# Patient Record
Sex: FEMALE | Race: WHITE
[De-identification: names, ages, dates, MRNs, and addresses within clinical notes are randomized per-mention and may not be internally consistent; named-entity substitution may affect disease eponyms.]

---

## 2017-08-11 ENCOUNTER — HOSPITAL ENCOUNTER (OUTPATIENT)
Dept: HOSPITAL 53 - M SMT | Age: 32
End: 2017-08-11
Attending: OBSTETRICS & GYNECOLOGY
Payer: COMMERCIAL

## 2017-08-11 DIAGNOSIS — O16.2: Primary | ICD-10-CM

## 2017-08-11 DIAGNOSIS — Z3A.00: ICD-10-CM

## 2017-08-11 LAB
ALT SERPL W P-5'-P-CCNC: 30 U/L (ref 12–78)
AST SERPL-CCNC: 23 U/L (ref 15–37)
BILIRUB SERPL-MCNC: 0.4 MG/DL (ref 0.2–1)
CREAT SERPL-MCNC: 0.48 MG/DL (ref 0.55–1.02)
ERYTHROCYTE [DISTWIDTH] IN BLOOD BY AUTOMATED COUNT: 15.3 % (ref 11.5–14.5)
GFR SERPL CREATININE-BSD FRML MDRD: > 60 ML/MIN/{1.73_M2} (ref 60–?)
MCH RBC QN AUTO: 27.8 PG (ref 27–33)
MCHC RBC AUTO-ENTMCNC: 32.9 G/DL (ref 32–36.5)
MCV RBC AUTO: 84.6 FL (ref 80–96)
PLATELET # BLD AUTO: 307 K/MM3 (ref 150–450)
URATE SERPL-MCNC: 3.9 MG/DL (ref 2.6–6)
WBC # BLD AUTO: 13.4 K/MM3 (ref 4–10)

## 2017-12-01 ENCOUNTER — HOSPITAL ENCOUNTER (OUTPATIENT)
Dept: HOSPITAL 53 - M LDO | Age: 32
Discharge: HOME | End: 2017-12-01
Attending: OBSTETRICS & GYNECOLOGY
Payer: COMMERCIAL

## 2017-12-01 VITALS — SYSTOLIC BLOOD PRESSURE: 149 MMHG | DIASTOLIC BLOOD PRESSURE: 71 MMHG

## 2017-12-01 VITALS — SYSTOLIC BLOOD PRESSURE: 139 MMHG | DIASTOLIC BLOOD PRESSURE: 66 MMHG

## 2017-12-01 VITALS — SYSTOLIC BLOOD PRESSURE: 167 MMHG | DIASTOLIC BLOOD PRESSURE: 77 MMHG

## 2017-12-01 VITALS — DIASTOLIC BLOOD PRESSURE: 72 MMHG | SYSTOLIC BLOOD PRESSURE: 158 MMHG

## 2017-12-01 VITALS — DIASTOLIC BLOOD PRESSURE: 67 MMHG | SYSTOLIC BLOOD PRESSURE: 144 MMHG

## 2017-12-01 VITALS — WEIGHT: 264.55 LBS | HEIGHT: 67 IN | BODY MASS INDEX: 41.52 KG/M2

## 2017-12-01 DIAGNOSIS — F41.9: ICD-10-CM

## 2017-12-01 DIAGNOSIS — O28.3: Primary | ICD-10-CM

## 2017-12-01 DIAGNOSIS — O10.113: ICD-10-CM

## 2017-12-01 DIAGNOSIS — O99.343: ICD-10-CM

## 2017-12-01 DIAGNOSIS — Z79.899: ICD-10-CM

## 2017-12-01 DIAGNOSIS — Z3A.29: ICD-10-CM

## 2017-12-01 LAB
ALBUMIN SERPL BCG-MCNC: 2.7 GM/DL (ref 3.2–5.2)
ALBUMIN/GLOB SERPL: 0.61 {RATIO} (ref 1–1.93)
ALP SERPL-CCNC: 91 U/L (ref 45–117)
ALT SERPL W P-5'-P-CCNC: 14 U/L (ref 12–78)
ANION GAP SERPL CALC-SCNC: 11 MEQ/L (ref 8–16)
AST SERPL-CCNC: 5 U/L (ref 7–37)
BILIRUB SERPL-MCNC: 0.3 MG/DL (ref 0.2–1)
BUN SERPL-MCNC: 8 MG/DL (ref 7–18)
CALCIUM SERPL-MCNC: 8.7 MG/DL (ref 8.5–10.1)
CHLORIDE SERPL-SCNC: 108 MEQ/L (ref 98–107)
CO2 SERPL-SCNC: 20 MEQ/L (ref 21–32)
CREAT SERPL-MCNC: 0.44 MG/DL (ref 0.55–1.02)
ERYTHROCYTE [DISTWIDTH] IN BLOOD BY AUTOMATED COUNT: 14.2 % (ref 11.5–14.5)
GFR SERPL CREATININE-BSD FRML MDRD: > 60 ML/MIN/{1.73_M2} (ref 60–?)
GLUCOSE SERPL-MCNC: 83 MG/DL (ref 70–105)
MCH RBC QN AUTO: 27.8 PG (ref 27–33)
MCHC RBC AUTO-ENTMCNC: 32.8 G/DL (ref 32–36.5)
MCV RBC AUTO: 84.5 FL (ref 80–96)
NRBC BLD AUTO-RTO: 0 % (ref 0–0)
PLATELET # BLD AUTO: 301 10^3/UL (ref 150–450)
POTASSIUM SERPL-SCNC: 3.6 MEQ/L (ref 3.5–5.1)
PROT SERPL-MCNC: 7.1 GM/DL (ref 6.4–8.2)
SODIUM SERPL-SCNC: 139 MEQ/L (ref 136–145)
URATE SERPL-MCNC: 4 MG/DL (ref 2.6–6)
WBC # BLD AUTO: 13.4 10^3/UL (ref 4–10)

## 2017-12-01 NOTE — REP
Obstetric sonography:

 

History:  Supervision of pregnancy.  Limited study.  Hypertension.

 

Findings:  Scanning demonstrates a viable single intrauterine gestation in a

cephalic fetal lie.  Placenta is posterior grade 2 without evidence of previa.

SD ratio in the umbilical cord artery by Doppler is normal at 2.75.  Fetal heart

rate is recorded at 136 beats per minute.  Closed cervical length measured

transabdominally is 4.5 cm.  Biophysical profile score is eight out of a possible

eight.  Amniotic fluid is subjectively normal.  ANNA is slightly decreased however

at 8.3 cm (9.1-23.2).  SD ratio in the umbilical cord artery by Doppler is normal

at 33.8 centimeters per second.

 

 

Signed by

Ronald Newton MD 12/03/2017 12:31 P

## 2017-12-01 NOTE — IPNPDOC
Text Note


Date of Service


The patient was seen on 17.





NOTE





SUBJECTIVE: Patient is a 32-year-old-female who is a  at 29.2 weeks 

gestation with an CASSY of 17 based off of her 1st trimester ultrasound. She 

initiated care in her 1st trimester at [facility]. Her pregnancy has 

been complicated by CHTN, anxiety, and a recent Sofía en Y gastric bypass in May 

2017. She is currently taking Labetalol 400 mg PO BID for CHTN, Vitafusion 

since her bypass, and buspar for her anxiety. She presented to L&D after having 

a BPP in another hospital of . Denies pain. She reports active fetal 

movement. Denies leaking of fluid, vaginal bleeding, contractions, or 

preeclamptic symptoms including visual changes, headaches, or epigastric pain. 





OBJECTIVE: VS: See below. Labs: see below. , moderate variability, 

positive 10x10 and 15x15 accelerations, no decelerations. Contractions: none. 

Abdomen soft to palpation. 





ASSESSMENT: IUP AT 29.2 weeks gestation, CHTN, post Sofía en Y gastric bypass, 

anxiety, Category I FHR tracing





PLAN: Dr. Vargas has evaluated the patient when she arrived to the hospital. Her 

ultrasound and NST result in a 10/10 BPP. Patient to continue with her routine 

OB visits and weekly BPP's. Patient to be discharged to home with her . 

Education done on access to care, fetal kick counts, danger signs, and 

preeclamptic symptoms to report. Patient is to continue with her Labetalol 400 

mg BID as ordered and fingersticks as directed.





VS,Eugene, I+O


VS, Caitlyne, I+O


Laboratory Tests


17 15:49








Calcium Level 8.7, Aspartate Amino Transf (AST/SGOT) 5 L, Alanine 

Aminotransferase (ALT/SGPT) 14, Lactate Dehydrogenase 134, Alkaline Phosphatase 

91, Total Bilirubin 0.3, Uric Acid 4.0, Total Protein 7.1, Albumin 2.7 L





17 15:55








Red Blood Count 4.00, Mean Corpuscular Volume 84.5, Mean Corpuscular Hemoglobin 

27.8, Mean Corpuscular Hemoglobin Concent 32.8, Red Cell Distribution Width 14.2











Item Value  Date Time


 


Urine Random Creatinine 200.0 MG/DL 17 1549


 


Urine Random Total Protein 20.1 MG/DL H 17 1549











Vital Signs








  Date Time  Temp Pulse Resp B/P (MAP) Pulse Ox O2 Delivery O2 Flow Rate FiO2


 


17 20:02    144/67    


 


17 19:11 98.3 69 18     














Obstetric sonography:


 


History:  Supervision of pregnancy.  Limited study.  Hypertension.


 


Findings:  Scanning demonstrates a viable single intrauterine gestation in a


cephalic fetal lie.  Placenta is posterior grade 2 without evidence of previa.


SD ratio in the umbilical cord artery by Doppler is normal at 2.75.  Fetal heart


rate is recorded at 136 beats per minute.  Closed cervical length measured


transabdominally is 4.5 cm.  Biophysical profile score is eight out of a 

possible


eight.  Amniotic fluid is subjectively normal.  ANNA is slightly decreased 

however


at 8.3 cm (9.1-23.2).  SD ratio in the umbilical cord artery by Doppler is 

normal


at 33.8 centimeters per second.











SUSAN MCKEON CNM Dec 1, 2017 20:32

## 2017-12-12 ENCOUNTER — HOSPITAL ENCOUNTER (OUTPATIENT)
Dept: HOSPITAL 53 - M SMT | Age: 32
End: 2017-12-12
Attending: OBSTETRICS & GYNECOLOGY
Payer: COMMERCIAL

## 2017-12-12 DIAGNOSIS — Z36.89: Primary | ICD-10-CM

## 2017-12-12 DIAGNOSIS — Z3A.00: ICD-10-CM

## 2017-12-12 LAB
ALT SERPL W P-5'-P-CCNC: 10 U/L (ref 12–78)
AST SERPL-CCNC: 8 U/L (ref 7–37)
BILIRUB SERPL-MCNC: 0.3 MG/DL (ref 0.2–1)
CREAT SERPL-MCNC: 0.61 MG/DL (ref 0.55–1.02)
ERYTHROCYTE [DISTWIDTH] IN BLOOD BY AUTOMATED COUNT: 14 % (ref 11.5–14.5)
GFR SERPL CREATININE-BSD FRML MDRD: > 60 ML/MIN/{1.73_M2} (ref 60–?)
MCH RBC QN AUTO: 27.8 PG (ref 27–33)
MCHC RBC AUTO-ENTMCNC: 32.5 G/DL (ref 32–36.5)
MCV RBC AUTO: 85.5 FL (ref 80–96)
NRBC BLD AUTO-RTO: 0 % (ref 0–0)
PLATELET # BLD AUTO: 307 10^3/UL (ref 150–450)
URATE SERPL-MCNC: 3.8 MG/DL (ref 2.6–6)
WBC # BLD AUTO: 11.8 10^3/UL (ref 4–10)

## 2018-01-02 ENCOUNTER — HOSPITAL ENCOUNTER (OUTPATIENT)
Dept: HOSPITAL 53 - M LAB REF | Age: 33
End: 2018-01-02
Attending: ADVANCED PRACTICE MIDWIFE
Payer: COMMERCIAL

## 2018-01-02 ENCOUNTER — HOSPITAL ENCOUNTER (OUTPATIENT)
Dept: HOSPITAL 53 - M LDO | Age: 33
Discharge: HOME | End: 2018-01-02
Attending: OBSTETRICS & GYNECOLOGY
Payer: COMMERCIAL

## 2018-01-02 DIAGNOSIS — Z3A.33: ICD-10-CM

## 2018-01-02 DIAGNOSIS — O10.013: Primary | ICD-10-CM

## 2018-01-02 DIAGNOSIS — Z3A.00: ICD-10-CM

## 2018-01-02 DIAGNOSIS — O10.913: Primary | ICD-10-CM

## 2018-01-02 DIAGNOSIS — Z79.899: ICD-10-CM

## 2018-01-02 LAB
ALT SERPL W P-5'-P-CCNC: 10 U/L (ref 12–78)
AST SERPL-CCNC: 15 U/L (ref 7–37)
BILIRUBIN,TOTAL: 0.3 MG/DL (ref 0.2–1)
CREATININE FOR GFR: 0.43 MG/DL (ref 0.55–1.02)
CREATININE,RANDOM URINE: 64 MG/DL
GFR SERPL CREATININE-BSD FRML MDRD: > 60 ML/MIN/{1.73_M2} (ref 60–?)
GLUCOSE BLDC GLUCOMTR-MCNC: 87 MG/DL (ref 70–105)
HEMATOCRIT: 36.2 % (ref 36–47)
HEMOGLOBIN: 12.3 G/DL (ref 12–16)
LDH SERPL L TO P-CCNC: 172 U/L (ref 84–246)
MEAN CORPUSCULAR HEMOGLOBIN: 28.2 PG (ref 27–33)
MEAN CORPUSCULAR HGB CONC: 34 G/DL (ref 32–36.5)
MEAN CORPUSCULAR VOLUME: 83 FL (ref 80–96)
NRBC BLD AUTO-RTO: 0 % (ref 0–0)
PLATELET COUNT, AUTOMATED: 317 10^3/UL (ref 150–450)
RED BLOOD COUNT: 4.36 10^6/UL (ref 4–5.4)
RED CELL DISTRIBUTION WIDTH: 13.3 % (ref 11.5–14.5)
TOTAL PROTEIN,RANDOM URINE: 15.9 MG/DL (ref 0–12)
URIC ACID: 3.8 MG/DL (ref 2.6–6)
WHITE BLOOD COUNT: 13 10^3/UL (ref 4–10)

## 2018-01-02 PROCEDURE — 87081 CULTURE SCREEN ONLY: CPT

## 2018-01-02 PROCEDURE — 96372 THER/PROPH/DIAG INJ SC/IM: CPT

## 2018-01-02 RX ADMIN — LABETALOL HCL 1 MG: 200 TABLET, FILM COATED ORAL at 13:51

## 2018-01-02 RX ADMIN — BETAMETHASONE SODIUM PHOSPHATE AND BETAMETHASONE ACETATE 1 MG: 3; 3 INJECTION, SUSPENSION INTRA-ARTICULAR; INTRALESIONAL; INTRAMUSCULAR at 12:06

## 2018-01-02 RX ADMIN — ACETAMINOPHEN 1 MG: 500 TABLET ORAL at 12:06

## 2018-01-03 ENCOUNTER — HOSPITAL ENCOUNTER (OUTPATIENT)
Dept: HOSPITAL 53 - M LDO | Age: 33
Discharge: HOME | End: 2018-01-03
Attending: OBSTETRICS & GYNECOLOGY
Payer: COMMERCIAL

## 2018-01-03 DIAGNOSIS — Z3A.35: ICD-10-CM

## 2018-01-03 DIAGNOSIS — O14.93: Primary | ICD-10-CM

## 2018-01-03 PROCEDURE — 96372 THER/PROPH/DIAG INJ SC/IM: CPT

## 2018-01-03 RX ADMIN — BETAMETHASONE SODIUM PHOSPHATE AND BETAMETHASONE ACETATE 1 MG: 3; 3 INJECTION, SUSPENSION INTRA-ARTICULAR; INTRALESIONAL; INTRAMUSCULAR at 12:44

## 2018-01-17 ENCOUNTER — HOSPITAL ENCOUNTER (OUTPATIENT)
Dept: HOSPITAL 53 - M LDO | Age: 33
Discharge: HOME | End: 2018-01-17
Attending: OBSTETRICS & GYNECOLOGY
Payer: COMMERCIAL

## 2018-01-17 DIAGNOSIS — Z3A.36: ICD-10-CM

## 2018-01-17 DIAGNOSIS — O10.013: Primary | ICD-10-CM

## 2018-01-17 PROCEDURE — 59025 FETAL NON-STRESS TEST: CPT

## 2018-01-24 ENCOUNTER — HOSPITAL ENCOUNTER (INPATIENT)
Dept: HOSPITAL 53 - M LDI | Age: 33
LOS: 6 days | Discharge: HOME | DRG: 540 | End: 2018-01-30
Attending: ADVANCED PRACTICE MIDWIFE | Admitting: ADVANCED PRACTICE MIDWIFE
Payer: COMMERCIAL

## 2018-01-24 DIAGNOSIS — E66.9: ICD-10-CM

## 2018-01-24 DIAGNOSIS — Z3A.37: ICD-10-CM

## 2018-01-24 DIAGNOSIS — Z79.899: ICD-10-CM

## 2018-01-24 DIAGNOSIS — O61.0: ICD-10-CM

## 2018-01-24 LAB
ALBUMIN/GLOBULIN RATIO: 0.73 (ref 1–1.93)
ALBUMIN: 2.7 GM/DL (ref 3.2–5.2)
ALKALINE PHOSPHATASE: 133 U/L (ref 45–117)
ALT SERPL W P-5'-P-CCNC: 10 U/L (ref 12–78)
ALT SERPL W P-5'-P-CCNC: 12 U/L (ref 12–78)
AMPHETAMINES UR QL SCN: NEGATIVE
ANION GAP: 8 MEQ/L (ref 8–16)
APPEARANCE, URINE: (no result)
AST SERPL-CCNC: 10 U/L (ref 7–37)
AST SERPL-CCNC: 12 U/L (ref 7–37)
BACTERIA UR QL AUTO: (no result)
BARBITURATES UR QL SCN: NEGATIVE
BENZODIAZ UR QL SCN: NEGATIVE
BILIRUBIN, URINE AUTO: NEGATIVE
BILIRUBIN,TOTAL: 0.2 MG/DL (ref 0.2–1)
BILIRUBIN,TOTAL: 0.2 MG/DL (ref 0.2–1)
BLOOD UREA NITROGEN: 8 MG/DL (ref 7–18)
BLOOD, URINE BLOOD: NEGATIVE
BZE UR QL SCN: NEGATIVE
CALCIUM LEVEL: 8.2 MG/DL (ref 8.5–10.1)
CANNABINOIDS UR QL SCN: NEGATIVE
CARBON DIOXIDE LEVEL: 22 MEQ/L (ref 21–32)
CHLORIDE LEVEL: 110 MEQ/L (ref 98–107)
CREATININE FOR GFR: 0.52 MG/DL (ref 0.55–1.02)
CREATININE FOR GFR: 0.53 MG/DL (ref 0.55–1.02)
CREATININE,RANDOM URINE: 274 MG/DL
GFR SERPL CREATININE-BSD FRML MDRD: > 60 ML/MIN/{1.73_M2} (ref 60–?)
GFR SERPL CREATININE-BSD FRML MDRD: > 60 ML/MIN/{1.73_M2} (ref 60–?)
GLUCOSE, FASTING: 85 MG/DL (ref 70–100)
GLUCOSE, URINE (UA) AUTO: NEGATIVE MG/DL
HEMATOCRIT: 34 % (ref 36–47)
HEMOGLOBIN: 11.4 G/DL (ref 12–16)
KETONE, URINE AUTO: NEGATIVE MG/DL
LDH SERPL L TO P-CCNC: 166 U/L (ref 84–246)
LEUKOCYTE ESTERASE UR QL STRIP.AUTO: (no result)
MEAN CORPUSCULAR HEMOGLOBIN: 27.9 PG (ref 27–33)
MEAN CORPUSCULAR HGB CONC: 33.5 G/DL (ref 32–36.5)
MEAN CORPUSCULAR VOLUME: 83.1 FL (ref 80–96)
METHADONE URINE REFLEX: NEGATIVE
MUCUS, URINE: (no result)
NITRITE, URINE AUTO: NEGATIVE
NRBC BLD AUTO-RTO: 0 % (ref 0–0)
OPIATES UR QL SCN: NEGATIVE
PCP UR QL SCN: NEGATIVE
PH,URINE: 5 UNITS (ref 5–9)
PLATELET COUNT, AUTOMATED: 231 10^3/UL (ref 150–450)
POTASSIUM SERUM: 4 MEQ/L (ref 3.5–5.1)
PROT UR QL STRIP.AUTO: (no result) MG/DL
RBC, URINE AUTO: 2 /HPF (ref 0–3)
RED BLOOD COUNT: 4.09 10^6/UL (ref 4–5.4)
RED CELL DISTRIBUTION WIDTH: 14 % (ref 11.5–14.5)
SODIUM LEVEL: 140 MEQ/L (ref 136–145)
SPECIFIC GRAVITY URINE AUTO: 1.03 (ref 1–1.03)
SQUAMOUS #/AREA URNS AUTO: 17 /HPF (ref 0–6)
SYPHILIS: NONREACTIVE
TOTAL PROTEIN,RANDOM URINE: 46.4 MG/DL (ref 0–12)
TOTAL PROTEIN: 6.4 GM/DL (ref 6.4–8.2)
URIC ACID: 4.8 MG/DL (ref 2.6–6)
UROBILINOGEN, URINE AUTO: 4 MG/DL (ref 0–2)
WBC, URINE AUTO: 18 /HPF (ref 0–3)
WHITE BLOOD COUNT: 10.6 10^3/UL (ref 4–10)

## 2018-01-24 RX ADMIN — MISOPROSTOL 1 MCG: 100 TABLET ORAL at 14:20

## 2018-01-24 RX ADMIN — LABETALOL HCL 1 MG: 200 TABLET, FILM COATED ORAL at 09:00

## 2018-01-24 RX ADMIN — MISOPROSTOL 1 MCG: 100 TABLET ORAL at 22:20

## 2018-01-24 RX ADMIN — LABETALOL HYDROCHLORIDE 1 MG: 5 INJECTION INTRAVENOUS at 11:27

## 2018-01-24 RX ADMIN — MISOPROSTOL 1 MCG: 100 TABLET ORAL at 18:25

## 2018-01-24 RX ADMIN — SODIUM CHLORIDE, POTASSIUM CHLORIDE, SODIUM LACTATE AND CALCIUM CHLORIDE 1 ML: 600; 310; 30; 20 INJECTION, SOLUTION INTRAVENOUS at 09:15

## 2018-01-24 RX ADMIN — LABETALOL HCL 1 MG: 200 TABLET, FILM COATED ORAL at 14:20

## 2018-01-24 RX ADMIN — LABETALOL HCL 1 MG: 200 TABLET, FILM COATED ORAL at 20:54

## 2018-01-24 RX ADMIN — MISOPROSTOL 1 MCG: 100 TABLET ORAL at 09:46

## 2018-01-25 LAB
CORD GAS ABE A: -12
CORD GAS ABE V: -3.8
CORD GAS HCO3 A: 15 MEQ/L
CORD GAS HCO3 V: 20.8 MEQ/L
CORD GAS O2 SAT A: 67.5 %
CORD GAS O2 SAT V: 84.8 %
CORD GAS PCO2 A: 38 MMHG
CORD GAS PCO2 V: 36.8 MMHG
CORD GAS PH A: 7.21 UNITS
CORD GAS PH V: 7.37 UNITS
CORD GAS PO2 A: 36.3 MMHG
CORD GAS PO2 V: 39.6 MMHG
CORD GAS SBC A: 14.7 MEQ/L
CORD GAS SBC V: 21 MEQ/L
CORD GAS TCO2 A: 16.2 MEQ/L
CORD GAS TCO2 V: 21.9 MEQ/L

## 2018-01-25 PROCEDURE — 3E0DXGC INTRODUCTION OF OTHER THERAPEUTIC SUBSTANCE INTO MOUTH AND PHARYNX, EXTERNAL APPROACH: ICD-10-PCS

## 2018-01-25 RX ADMIN — MISOPROSTOL 1 MCG: 100 TABLET ORAL at 02:00

## 2018-01-25 RX ADMIN — LABETALOL HYDROCHLORIDE 1 MG: 5 INJECTION INTRAVENOUS at 04:30

## 2018-01-25 RX ADMIN — TERBUTALINE SULFATE 1 MG: 1 INJECTION SUBCUTANEOUS at 18:49

## 2018-01-25 RX ADMIN — SODIUM CHLORIDE, POTASSIUM CHLORIDE, SODIUM LACTATE AND CALCIUM CHLORIDE 1 MLS/HR: 600; 310; 30; 20 INJECTION, SOLUTION INTRAVENOUS at 21:33

## 2018-01-25 RX ADMIN — HYDRALAZINE HYDROCHLORIDE 1 MG: 20 INJECTION INTRAMUSCULAR; INTRAVENOUS at 09:10

## 2018-01-25 RX ADMIN — SODIUM CHLORIDE, POTASSIUM CHLORIDE, SODIUM LACTATE AND CALCIUM CHLORIDE 2 ML: 600; 310; 30; 20 INJECTION, SOLUTION INTRAVENOUS at 11:45

## 2018-01-25 RX ADMIN — LABETALOL HYDROCHLORIDE 1 MG: 5 INJECTION INTRAVENOUS at 08:00

## 2018-01-25 RX ADMIN — LABETALOL HYDROCHLORIDE 1 MG: 5 INJECTION INTRAVENOUS at 04:07

## 2018-01-25 RX ADMIN — LABETALOL HCL 1 MG: 200 TABLET, FILM COATED ORAL at 07:31

## 2018-01-25 RX ADMIN — Medication 1 MLS/HR: at 03:09

## 2018-01-25 RX ADMIN — MISOPROSTOL 1 MCG: 100 TABLET ORAL at 06:00

## 2018-01-25 RX ADMIN — LABETALOL HCL 1 MG: 200 TABLET, FILM COATED ORAL at 16:53

## 2018-01-25 RX ADMIN — PROMETHAZINE HYDROCHLORIDE 1 MG: 25 INJECTION INTRAMUSCULAR; INTRAVENOUS at 05:41

## 2018-01-25 RX ADMIN — SODIUM CHLORIDE, POTASSIUM CHLORIDE, SODIUM LACTATE AND CALCIUM CHLORIDE 2 ML: 600; 310; 30; 20 INJECTION, SOLUTION INTRAVENOUS at 16:54

## 2018-01-25 RX ADMIN — BUTORPHANOL TARTRATE 1 MG: 2 INJECTION, SOLUTION INTRAMUSCULAR; INTRAVENOUS at 05:41

## 2018-01-25 RX ADMIN — HYDRALAZINE HYDROCHLORIDE 1 MG: 20 INJECTION INTRAMUSCULAR; INTRAVENOUS at 08:47

## 2018-01-25 RX ADMIN — LABETALOL HYDROCHLORIDE 1 MG: 5 INJECTION INTRAVENOUS at 03:31

## 2018-01-25 RX ADMIN — Medication 1 MLS/HR: at 11:30

## 2018-01-26 LAB
HEMATOCRIT: 26.4 % (ref 36–47)
HEMOGLOBIN: 8.7 G/DL (ref 12–16)
MEAN CORPUSCULAR HEMOGLOBIN: 27.4 PG (ref 27–33)
MEAN CORPUSCULAR HGB CONC: 33 G/DL (ref 32–36.5)
MEAN CORPUSCULAR VOLUME: 83.3 FL (ref 80–96)
NRBC BLD AUTO-RTO: 0 % (ref 0–0)
PLATELET COUNT, AUTOMATED: 190 10^3/UL (ref 150–450)
RED BLOOD COUNT: 3.17 10^6/UL (ref 4–5.4)
RED CELL DISTRIBUTION WIDTH: 13.9 % (ref 11.5–14.5)
WHITE BLOOD COUNT: 18.1 10^3/UL (ref 4–10)

## 2018-01-26 RX ADMIN — DOCUSATE SODIUM 1 MG: 100 CAPSULE, LIQUID FILLED ORAL at 21:44

## 2018-01-26 RX ADMIN — KETOROLAC TROMETHAMINE 1 MG: 30 INJECTION, SOLUTION INTRAMUSCULAR at 21:44

## 2018-01-26 RX ADMIN — ACETAMINOPHEN 1 MG: 325 TABLET ORAL at 00:34

## 2018-01-26 RX ADMIN — KETOROLAC TROMETHAMINE 1 MG: 30 INJECTION, SOLUTION INTRAMUSCULAR at 08:58

## 2018-01-26 RX ADMIN — DOCUSATE SODIUM 1 MG: 100 CAPSULE, LIQUID FILLED ORAL at 08:58

## 2018-01-26 RX ADMIN — KETOROLAC TROMETHAMINE 1 MG: 30 INJECTION, SOLUTION INTRAMUSCULAR at 16:10

## 2018-01-26 RX ADMIN — LABETALOL HCL 1 MG: 200 TABLET, FILM COATED ORAL at 21:44

## 2018-01-26 RX ADMIN — SODIUM CHLORIDE, POTASSIUM CHLORIDE, SODIUM LACTATE AND CALCIUM CHLORIDE 1 MLS/HR: 600; 310; 30; 20 INJECTION, SOLUTION INTRAVENOUS at 05:33

## 2018-01-26 RX ADMIN — Medication 1 TAB: at 08:58

## 2018-01-26 RX ADMIN — LABETALOL HCL 1 MG: 200 TABLET, FILM COATED ORAL at 16:10

## 2018-01-26 RX ADMIN — LABETALOL HCL 1 MG: 200 TABLET, FILM COATED ORAL at 00:31

## 2018-01-26 RX ADMIN — LABETALOL HCL 1 MG: 200 TABLET, FILM COATED ORAL at 08:58

## 2018-01-26 RX ADMIN — KETOROLAC TROMETHAMINE 1 MG: 30 INJECTION, SOLUTION INTRAMUSCULAR at 04:35

## 2018-01-27 RX ADMIN — LABETALOL HCL 1 MG: 200 TABLET, FILM COATED ORAL at 15:57

## 2018-01-27 RX ADMIN — DOCUSATE SODIUM 1 MG: 100 CAPSULE, LIQUID FILLED ORAL at 09:51

## 2018-01-27 RX ADMIN — Medication 1 TAB: at 09:51

## 2018-01-27 RX ADMIN — IBUPROFEN 1 MG: 800 TABLET, FILM COATED ORAL at 05:56

## 2018-01-27 RX ADMIN — IBUPROFEN 1 MG: 800 TABLET, FILM COATED ORAL at 13:00

## 2018-01-27 RX ADMIN — LABETALOL HCL 1 MG: 200 TABLET, FILM COATED ORAL at 09:58

## 2018-01-27 RX ADMIN — DOCUSATE SODIUM 1 MG: 100 CAPSULE, LIQUID FILLED ORAL at 21:13

## 2018-01-27 RX ADMIN — IBUPROFEN 1 MG: 800 TABLET, FILM COATED ORAL at 21:13

## 2018-01-27 RX ADMIN — LABETALOL HCL 1 MG: 200 TABLET, FILM COATED ORAL at 21:13

## 2018-01-28 RX ADMIN — Medication 1 TAB: at 09:40

## 2018-01-28 RX ADMIN — LOSARTAN POTASSIUM 1 MG: 25 TABLET, FILM COATED ORAL at 18:34

## 2018-01-28 RX ADMIN — IBUPROFEN 1 MG: 800 TABLET, FILM COATED ORAL at 20:36

## 2018-01-28 RX ADMIN — IBUPROFEN 1 MG: 800 TABLET, FILM COATED ORAL at 05:00

## 2018-01-28 RX ADMIN — DOCUSATE SODIUM 1 MG: 100 CAPSULE, LIQUID FILLED ORAL at 09:41

## 2018-01-28 RX ADMIN — LABETALOL HCL 1 MG: 200 TABLET, FILM COATED ORAL at 09:40

## 2018-01-28 RX ADMIN — IBUPROFEN 1 MG: 800 TABLET, FILM COATED ORAL at 12:29

## 2018-01-28 RX ADMIN — ATENOLOL 1 MG: 25 TABLET ORAL at 18:34

## 2018-01-28 RX ADMIN — DOCUSATE SODIUM 1 MG: 100 CAPSULE, LIQUID FILLED ORAL at 20:24

## 2018-01-29 RX ADMIN — LOSARTAN POTASSIUM 1 MG: 25 TABLET, FILM COATED ORAL at 06:22

## 2018-01-29 RX ADMIN — DOCUSATE SODIUM 1 MG: 100 CAPSULE, LIQUID FILLED ORAL at 21:00

## 2018-01-29 RX ADMIN — Medication 1 TAB: at 11:01

## 2018-01-29 RX ADMIN — IBUPROFEN 1 MG: 800 TABLET, FILM COATED ORAL at 04:40

## 2018-01-29 RX ADMIN — IBUPROFEN 1 MG: 800 TABLET, FILM COATED ORAL at 14:47

## 2018-01-29 RX ADMIN — LOSARTAN POTASSIUM 1 MG: 25 TABLET, FILM COATED ORAL at 21:36

## 2018-01-29 RX ADMIN — IBUPROFEN 1 MG: 800 TABLET, FILM COATED ORAL at 21:36

## 2018-01-29 RX ADMIN — ATENOLOL 1 MG: 25 TABLET ORAL at 21:37

## 2018-01-29 RX ADMIN — DOCUSATE SODIUM 1 MG: 100 CAPSULE, LIQUID FILLED ORAL at 09:00

## 2018-01-29 RX ADMIN — ATENOLOL 1 MG: 25 TABLET ORAL at 06:23

## 2018-01-30 RX ADMIN — IBUPROFEN 1 MG: 800 TABLET, FILM COATED ORAL at 14:02

## 2018-01-30 RX ADMIN — DOCUSATE SODIUM 1 MG: 100 CAPSULE, LIQUID FILLED ORAL at 07:49

## 2018-01-30 RX ADMIN — IBUPROFEN 1 MG: 800 TABLET, FILM COATED ORAL at 05:06

## 2018-01-30 RX ADMIN — ATENOLOL 1 MG: 25 TABLET ORAL at 07:49

## 2018-01-30 RX ADMIN — Medication 1 TAB: at 07:47

## 2018-01-30 RX ADMIN — LOSARTAN POTASSIUM 1 MG: 25 TABLET, FILM COATED ORAL at 07:48

## 2019-10-25 ENCOUNTER — HOSPITAL ENCOUNTER (OUTPATIENT)
Dept: HOSPITAL 53 - M SFHCLERA | Age: 34
End: 2019-10-25
Attending: NURSE PRACTITIONER
Payer: COMMERCIAL

## 2019-10-25 DIAGNOSIS — J02.9: Primary | ICD-10-CM
